# Patient Record
Sex: MALE | Race: OTHER | HISPANIC OR LATINO
[De-identification: names, ages, dates, MRNs, and addresses within clinical notes are randomized per-mention and may not be internally consistent; named-entity substitution may affect disease eponyms.]

---

## 2022-07-18 ENCOUNTER — APPOINTMENT (OUTPATIENT)
Dept: ORTHOPEDIC SURGERY | Facility: CLINIC | Age: 52
End: 2022-07-18

## 2022-07-18 VITALS — BODY MASS INDEX: 27.22 KG/M2 | HEIGHT: 72 IN | WEIGHT: 201 LBS

## 2022-07-18 DIAGNOSIS — S63.502A UNSPECIFIED SPRAIN OF LEFT WRIST, INITIAL ENCOUNTER: ICD-10-CM

## 2022-07-18 DIAGNOSIS — Z78.9 OTHER SPECIFIED HEALTH STATUS: ICD-10-CM

## 2022-07-18 DIAGNOSIS — S59.902A UNSPECIFIED INJURY OF LEFT ELBOW, INITIAL ENCOUNTER: ICD-10-CM

## 2022-07-18 PROBLEM — Z00.00 ENCOUNTER FOR PREVENTIVE HEALTH EXAMINATION: Status: ACTIVE | Noted: 2022-07-18

## 2022-07-18 PROCEDURE — 73080 X-RAY EXAM OF ELBOW: CPT | Mod: LT

## 2022-07-18 PROCEDURE — 99203 OFFICE O/P NEW LOW 30 MIN: CPT

## 2022-07-18 PROCEDURE — 73110 X-RAY EXAM OF WRIST: CPT | Mod: LT

## 2022-07-18 RX ORDER — IBUPROFEN 800 MG/1
800 TABLET, FILM COATED ORAL 3 TIMES DAILY
Qty: 90 | Refills: 0 | Status: ACTIVE | COMMUNITY
Start: 2022-07-18 | End: 1900-01-01

## 2022-07-18 NOTE — DISCUSSION/SUMMARY
[de-identified] : At this time I placed him in a cock-up wrist brace and a sling.  I discussed with him I would like to get an MRI of his elbow to further evaluate for an avulsion fracture and any ligament injury.  I discussed with him the findings in the left wrist.  He did have a previous injury to the wrist.  I sent a script for an anti-inflammatory to the pharmacy.  He is going to rest and ice the areas, he can also do some warm compresses.  Will schedule him a follow-up in the next 2 weeks for repeat evaluation.  He can call after the MRI to go  over the results. Patient will call me if any other problems or concerns.  Patient verbalized understanding and agreed with the plan, all questions were answered in the office today.\par \par This patient was seen under the supervision of Dr. Boyd.\par
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
## 2022-07-18 NOTE — IMAGING
[de-identified] :   On examination of his left elbow he has mild swelling of the elbow, no erythema, no ecchymosis.  He has good range of motion slightly decreased with full extension.  He is tender palpation over the lateral epicondyle.  Slightly tender over the medial epicondyle.  He is nontender over the olecranon.  He is nontender to palpation over the forearm.  He is very tender to palpation of the left wrist.  He is tender to palpation over the distal radius and the distal ulna, he is tender over the snuffbox, he has swelling  of the wrist, no erythema, no ecchymosis.  He has no tenderness palpation over the metacarpals or the fingers.  He is able to open and close his fist he has limited range of motion of the wrist. 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  There are no acute fractures of the wrist.  No dislocation.\par   X-rays taken of the left elbow show questionable avulsion off the lateral epicondyle.  There is some mild degenerative changes of the elbow.  No other fractures, dislocations, or bony abnormalities noted.

## 2022-11-20 ENCOUNTER — NON-APPOINTMENT (OUTPATIENT)
Age: 52
End: 2022-11-20